# Patient Record
Sex: FEMALE | Race: WHITE | NOT HISPANIC OR LATINO | URBAN - METROPOLITAN AREA
[De-identification: names, ages, dates, MRNs, and addresses within clinical notes are randomized per-mention and may not be internally consistent; named-entity substitution may affect disease eponyms.]

---

## 2017-01-17 ENCOUNTER — APPOINTMENT (RX ONLY)
Dept: URBAN - METROPOLITAN AREA CLINIC 12 | Facility: CLINIC | Age: 82
Setting detail: DERMATOLOGY
End: 2017-01-17

## 2017-01-17 DIAGNOSIS — L82.0 INFLAMED SEBORRHEIC KERATOSIS: ICD-10-CM

## 2017-01-17 DIAGNOSIS — L82.1 OTHER SEBORRHEIC KERATOSIS: ICD-10-CM

## 2017-01-17 DIAGNOSIS — L44.8 OTHER SPECIFIED PAPULOSQUAMOUS DISORDERS: ICD-10-CM

## 2017-01-17 DIAGNOSIS — Z85.828 PERSONAL HISTORY OF OTHER MALIGNANT NEOPLASM OF SKIN: ICD-10-CM

## 2017-01-17 DIAGNOSIS — L57.0 ACTINIC KERATOSIS: ICD-10-CM

## 2017-01-17 PROBLEM — L44.9 PAPULOSQUAMOUS DISORDER, UNSPECIFIED: Status: ACTIVE | Noted: 2017-01-17

## 2017-01-17 PROBLEM — D48.5 NEOPLASM OF UNCERTAIN BEHAVIOR OF SKIN: Status: ACTIVE | Noted: 2017-01-17

## 2017-01-17 PROCEDURE — 11310 SHAVE SKIN LESION 0.5 CM/<: CPT | Mod: 59

## 2017-01-17 PROCEDURE — 99213 OFFICE O/P EST LOW 20 MIN: CPT | Mod: 25

## 2017-01-17 PROCEDURE — ? SHAVE REMOVAL

## 2017-01-17 PROCEDURE — ? COUNSELING

## 2017-01-17 PROCEDURE — 17000 DESTRUCT PREMALG LESION: CPT

## 2017-01-17 PROCEDURE — ? TREATMENT REGIMEN

## 2017-01-17 PROCEDURE — ? LIQUID NITROGEN

## 2017-01-17 ASSESSMENT — LOCATION SIMPLE DESCRIPTION DERM
LOCATION SIMPLE: RIGHT CHEEK
LOCATION SIMPLE: RIGHT PRETIBIAL REGION
LOCATION SIMPLE: LEFT FOREARM
LOCATION SIMPLE: RIGHT FOREARM
LOCATION SIMPLE: RIGHT UPPER ARM

## 2017-01-17 ASSESSMENT — LOCATION ZONE DERM
LOCATION ZONE: ARM
LOCATION ZONE: FACE
LOCATION ZONE: LEG

## 2017-01-17 ASSESSMENT — LOCATION DETAILED DESCRIPTION DERM
LOCATION DETAILED: RIGHT SUPERIOR CENTRAL BUCCAL CHEEK
LOCATION DETAILED: RIGHT PROXIMAL PRETIBIAL REGION
LOCATION DETAILED: LEFT DISTAL DORSAL FOREARM
LOCATION DETAILED: RIGHT ANTERIOR PROXIMAL UPPER ARM
LOCATION DETAILED: RIGHT DISTAL DORSAL FOREARM
LOCATION DETAILED: RIGHT INFERIOR LATERAL MALAR CHEEK

## 2017-01-17 NOTE — PROCEDURE: MIPS QUALITY
Detail Level: Generalized
Quality 130: Documentation Of Current Medications In The Medical Record: Current Medications Documented

## 2017-01-17 NOTE — HPI: SKIN LESION
Is This A New Presentation, Or A Follow-Up?: Skin Lesion
How Severe Is Your Skin Lesion?: mild
Has Your Skin Lesion Been Treated?: not been treated
Additional History: Full skin exam requested, no other concerns per patient.

## 2017-01-17 NOTE — PROCEDURE: SHAVE REMOVAL
Anesthesia Type: 1% lidocaine with epinephrine and a 1:10 solution of 8.4% sodium bicarbonate
Size Of Lesion In Cm (Required): 0.5
Hemostasis: Aluminum Chloride and Electrocautery
Billing Type: Third-Party Bill
Wound Care: Vaseline
Size Of Margin In Cm (Margins Are Not Added To Billing Dimensions): 0
Render Post-Care Instructions In Note?: no
Medical Necessity Information: It is in your best interest to select a reason for this procedure from the list below. All of these items fulfill various CMS LCD requirements except the new and changing color options.
Medical Necessity Clause: This procedure was medically necessary because the lesion that was treated was:
Path Notes (To The Dermatopathologist): Please check margins.
Post-Care Instructions: I reviewed with the patient in detail post-care instructions. Patient is to keep the biopsy site dry overnight, and then apply bacitracin twice daily until healed. Patient may apply hydrogen peroxide soaks to remove any crusting.
Biopsy Method: 15 blade
Consent was obtained from the patient. The risks and benefits to therapy were discussed in detail. Specifically, the risks of infection, scarring, bleeding, prolonged wound healing, incomplete removal, allergy to anesthesia, nerve injury and recurrence were addressed. Prior to the procedure, the treatment site was clearly identified and confirmed by the patient. All components of Universal Protocol/PAUSE Rule completed.
Lab: [id number]
Detail Level: Detailed
Notification Instructions: Patient will be notified of biopsy results. However, patient instructed to call the office if not contacted within 2 weeks.

## 2017-01-17 NOTE — PROCEDURE: LIQUID NITROGEN
Render Post-Care Instructions In Note?: no
Number Of Freeze-Thaw Cycles: 2 freeze-thaw cycles
Post-Care Instructions: I reviewed with the patient in detail post-care instructions. Patient is to wear sunprotection, and avoid picking at any of the treated lesions. Pt may apply Vaseline to crusted or scabbing areas.
Detail Level: Detailed
Duration Of Freeze Thaw-Cycle (Seconds): 10
Consent: The patient's consent was obtained including but not limited to risks of crusting, scabbing, blistering, scarring, darker or lighter pigmentary change, recurrence, incomplete removal and infection.

## 2018-03-13 ENCOUNTER — APPOINTMENT (RX ONLY)
Dept: URBAN - METROPOLITAN AREA CLINIC 12 | Facility: CLINIC | Age: 83
Setting detail: DERMATOLOGY
End: 2018-03-13

## 2018-03-13 DIAGNOSIS — L21.8 OTHER SEBORRHEIC DERMATITIS: ICD-10-CM

## 2018-03-13 DIAGNOSIS — L82.1 OTHER SEBORRHEIC KERATOSIS: ICD-10-CM

## 2018-03-13 PROCEDURE — ? PRESCRIPTION

## 2018-03-13 PROCEDURE — ? COUNSELING

## 2018-03-13 PROCEDURE — 99213 OFFICE O/P EST LOW 20 MIN: CPT

## 2018-03-13 PROCEDURE — ? IN-HOUSE DISPENSING PHARMACY

## 2018-03-13 PROCEDURE — ? TREATMENT REGIMEN

## 2018-03-13 RX ORDER — CLOBETASOL PROPIONATE 0.05 %
SHAMPOO TOPICAL
Qty: 1 | Refills: 2 | Status: ERX | COMMUNITY
Start: 2018-03-13

## 2018-03-13 RX ADMIN — Medication: at 00:00

## 2018-03-13 ASSESSMENT — LOCATION SIMPLE DESCRIPTION DERM
LOCATION SIMPLE: SCALP
LOCATION SIMPLE: RIGHT UPPER BACK

## 2018-03-13 ASSESSMENT — LOCATION ZONE DERM
LOCATION ZONE: TRUNK
LOCATION ZONE: SCALP

## 2018-03-13 ASSESSMENT — LOCATION DETAILED DESCRIPTION DERM
LOCATION DETAILED: RIGHT MEDIAL UPPER BACK
LOCATION DETAILED: LEFT SUPERIOR PARIETAL SCALP

## 2018-03-13 NOTE — HPI: BODY LOCATION - SCALP
How Severe Is Your Condition?: mild
Additional History: Diclofenac Sodium topical gel 1%, which hasn’t helped, patient hasn’t been able to sleep because of this.

## 2018-03-13 NOTE — PROCEDURE: TREATMENT REGIMEN
Detail Level: Zone
Initiate Treatment: Clobex shampoo - apply to dry scalp 20 min before showering, then add water and lather, leave in additional 5 minutes before rinsing.\\nClobetasol solution (in house) 3 refills - apply bid to scalp x2 weeks

## 2018-03-13 NOTE — PROCEDURE: IN-HOUSE DISPENSING PHARMACY
Name Of Product 2: Tret 0.05% Cream - 316787 \\n Niacinamide 4% -Tretinoin 0.05%
Product 49 Unit Type: mg
Product 60 Price/Unit (In Dollars): 0
Product 10 Price/Unit (In Dollars): 20.00
Render Refills If Set To 0: Yes
Product 6 Amount/Unit (Numbers Only): 30
Name Of Product 7: Acne Gel - [id number]\\nAdapalene 0.3% - Benzoyl Peroxide 2.5% - Niacinamide 4%
Product 15 Amount/Unit (Numbers Only): 30
Product 5 Price/Unit (In Dollars): 45.00
Product 13 Price/Unit (In Dollars): 40.00
Product 14 Amount/Unit (Numbers Only): 60
Product 11 Unit Type: ml
Product 4 Refills: 4
Product 2 Application Directions: Apply 1 pump (pea-sized amount) to entire face at bed time
Name Of Product 8: Actinic Keratosis Gel - [id number]\\nImiquimoid 5% - Levocetirizine Dihydrochloride 1% - \\nNiacinamide 2%
Product 5 Refills: 3
Product 2 Refills: 2
Name Of Product 12: Clob Solution - 757164 \\nClobetasol Propionate 0.05% - Niacinamide 4%
Product 13 Application Directions: Apply to affected areas bid for up to 2 weeks then stop using 7-10 days before reusing.\\nDo not apply to face, armpit, or groin areas.
Product 16 Application Directions: Apply topically to affected area's QD
Name Of Product 4: Jason 0.1% Acne Gel - 765714 \\n Niacinamide 4% - Tazoratene 0.1%
Product 6 Price/Unit (In Dollars): 35.00
Product 15 Application Directions: Use bid for 2 weeks then stop using for 10-14 days before reusing
Product 11 Application Directions: Apply topically to  affected area's QD-BID
Product 12 Application Directions: Apply to affected areas bid for up to 3 weeks then stop using 10-14 days before reusing.\\nDo not apply to face, armpit, or groin areas.
Product 8 Application Directions: Apply topically to affected area's as directed by physician
Product 13 Amount/Unit (Numbers Only): 60
Name Of Product 10: Antibacterial Wound Ointment -704359 \\nAloe Vera 0.2% - Lidocaine 2% - Mupirocin 2%-Tranilast 1%
Name Of Product 9: Anti-Fungal Nail Solution 295620 - \\nCiclopirox 8% - Fluconazole 1% - Terbinafine 1%
Name Of Product 16: Fluocinolone Scalp & Body Oil - 082551 \\nFluocinolone Acetonide 0.01% In Emu and Olive Oil
Product 14 Unit Type: grams
Product 7 Application Directions: Apply topically to affected area's QD - BID
Name Of Product 14: Dermatitis Cream -049505  **60GM**\\nClobetasol Propionate 0.05% - Niacinamide 4%
Name Of Product 5: Acne Gel With Dapsone -160208\\nDapsone 8.5% - Niacinamide 2% - Spironolactone 5%
Product 18 Application Directions: Apply to warts then occlusion at bedtime
Name Of Product 11: Desonide Dermatitis Cream - 659415 \\nDesonide 0.05% - Niacinamide 4%
Name Of Product 18: Wart Gel - 563270 \\nCimetidine 5% - Ibuprofen 2% - Salicylic Acid 17%
Name Of Product 6: Acne Gel Combo -127495\\nBenzoyl Peroxide 5% - Clidamycin 1% - Niacinamide 4%
Name Of Product 13: Clob Ointment - 997079 **60GM**\\nClobetasol Propionate 0.05% - Niacinamide 4%
Product 14 Application Directions: Apply bid to affected areas for up to 3 weeks then stop using 10-14 days before reusing. \\nDo not use on face, armpit, or groin areas.
Product 4 Application Directions: Apply a pea-sized amount to entire face at bed time
Name Of Product 3: Tret 0.1% Cream - 676551 \\n Niacinamide 4% -Tretinoin 0.1%
Name Of Product 1: Tret 0.025% Cream - 266858 - \\nNiacinamide 4% -Tretinoin 0.025%
Product 12 Units Dispensed: 1
Product 10 Application Directions: Apply to wound BID X 1-2 weeks
Detail Level: Zone
Name Of Product 17: Rosacea Cream -741506\\nIvermectin 1% - Metronidazole 1% - Niacinamide 4% - Potassium Azeloyl Diglycinate 5%
Product 32 Amount/Unit (Numbers Only): 15
Product 9 Application Directions: Apply to affected toenails QD
Name Of Product 15: Triam Dermatitis Cream - 080650 \\nNiacinamide 4% - Triamcinolone Acetonide 0.1%
Product 17 Application Directions: Apply topically to face Bid
Name Of Product 15: Triam Dermatitis Cream - 036100 \\nNiacinamide 4% - Triamcinolone Acetonide 0.1%
Name Of Product 1: Tret 0.025% Cream - 501314 - \\nNiacinamide 4% -Tretinoin 0.025%
Name Of Product 18: Wart Gel - 857391 \\nCimetidine 5% - Ibuprofen 2% - Salicylic Acid 17%
Name Of Product 2: Tret 0.05% Cream - 939426 \\n Niacinamide 4% -Tretinoin 0.05%
Name Of Product 3: Tret 0.1% Cream - 189872 \\n Niacinamide 4% -Tretinoin 0.1%
Name Of Product 12: Clob Solution - 389892 \\nClobetasol Propionate 0.05% - Niacinamide 4%
Name Of Product 16: Fluocinolone Scalp & Body Oil - 671590 \\nFluocinolone Acetonide 0.01% In Emu and Olive Oil
Name Of Product 11: Desonide Dermatitis Cream - 623101 \\nDesonide 0.05% - Niacinamide 4%
Name Of Product 14: Dermatitis Cream -385348  **60GM**\\nClobetasol Propionate 0.05% - Niacinamide 4%
Product 12 Application Directions: Apply to affected areas bid for up to 3 weeks then stop using 10-14 days before reusing.\\nDo not apply to face, armpit, or groin areas.
Name Of Product 13: Clob Ointment - 798705 **60GM**\\nClobetasol Propionate 0.05% - Niacinamide 4%
Name Of Product 10: Antibacterial Wound Ointment -063250 \\nAloe Vera 0.2% - Lidocaine 2% - Mupirocin 2%-Tranilast 1%
Name Of Product 4: Jason 0.1% Acne Gel - 185477 \\n Niacinamide 4% - Tazoratene 0.1%
Product 13 Application Directions: Apply to affected areas bid for up to 2 weeks then stop using 7-10 days before reusing.\\nDo not apply to face, armpit, or groin areas.

## 2018-03-13 NOTE — PROCEDURE: MIPS QUALITY
Quality 111:Pneumonia Vaccination Status For Older Adults: Pneumococcal Vaccination Previously Received
Detail Level: Detailed
Quality 130: Documentation Of Current Medications In The Medical Record: Current Medications not Documented, Patient not Eligible
Quality 110: Preventive Care And Screening: Influenza Immunization: Influenza Immunization previously received during influenza season
Additional Notes: Patient will have med list faxed over to update

## 2018-07-26 RX ORDER — CLOBETASOL PROPIONATE 0.05 %
SHAMPOO TOPICAL
Qty: 1 | Refills: 2 | Status: ERX

## 2018-09-27 ENCOUNTER — APPOINTMENT (RX ONLY)
Dept: URBAN - METROPOLITAN AREA CLINIC 12 | Facility: CLINIC | Age: 83
Setting detail: DERMATOLOGY
End: 2018-09-27

## 2018-09-27 DIAGNOSIS — L29.8 OTHER PRURITUS: ICD-10-CM

## 2018-09-27 DIAGNOSIS — L29.89 OTHER PRURITUS: ICD-10-CM

## 2018-09-27 PROCEDURE — ? PRESCRIPTION

## 2018-09-27 PROCEDURE — ? COUNSELING

## 2018-09-27 PROCEDURE — 99212 OFFICE O/P EST SF 10 MIN: CPT

## 2018-09-27 PROCEDURE — ? TREATMENT REGIMEN

## 2018-09-27 RX ORDER — DOXEPIN HYDROCHLORIDE 10 MG/ML
SOLUTION ORAL
Qty: 1 | Refills: 4 | Status: ERX | COMMUNITY
Start: 2018-09-27

## 2018-09-27 RX ADMIN — DOXEPIN HYDROCHLORIDE: 10 SOLUTION ORAL at 20:25

## 2018-09-27 ASSESSMENT — LOCATION DETAILED DESCRIPTION DERM: LOCATION DETAILED: RIGHT SUPERIOR PARIETAL SCALP

## 2018-09-27 ASSESSMENT — LOCATION SIMPLE DESCRIPTION DERM: LOCATION SIMPLE: SCALP

## 2018-09-27 ASSESSMENT — LOCATION ZONE DERM: LOCATION ZONE: SCALP

## 2018-09-27 NOTE — PROCEDURE: TREATMENT REGIMEN
Otc Regimen: Recommended Scalpacin as needed.
Detail Level: Simple
Plan: Patient will have Primary Care Physician send over her last bloodwork results.

## 2022-06-28 ENCOUNTER — APPOINTMENT (RX ONLY)
Dept: URBAN - METROPOLITAN AREA CLINIC 12 | Facility: CLINIC | Age: 87
Setting detail: DERMATOLOGY
End: 2022-06-28

## 2022-06-28 DIAGNOSIS — D69.2 OTHER NONTHROMBOCYTOPENIC PURPURA: ICD-10-CM

## 2022-06-28 DIAGNOSIS — L57.0 ACTINIC KERATOSIS: ICD-10-CM

## 2022-06-28 DIAGNOSIS — L85.3 XEROSIS CUTIS: ICD-10-CM

## 2022-06-28 PROCEDURE — 99203 OFFICE O/P NEW LOW 30 MIN: CPT | Mod: 25

## 2022-06-28 PROCEDURE — ? LIQUID NITROGEN

## 2022-06-28 PROCEDURE — 17000 DESTRUCT PREMALG LESION: CPT

## 2022-06-28 PROCEDURE — ? COUNSELING

## 2022-06-28 ASSESSMENT — LOCATION DETAILED DESCRIPTION DERM
LOCATION DETAILED: LEFT PROXIMAL DORSAL FOREARM
LOCATION DETAILED: RIGHT VENTRAL PROXIMAL FOREARM
LOCATION DETAILED: LEFT ELBOW
LOCATION DETAILED: LEFT ANTECUBITAL SKIN
LOCATION DETAILED: RIGHT PROXIMAL DORSAL FOREARM

## 2022-06-28 ASSESSMENT — LOCATION SIMPLE DESCRIPTION DERM
LOCATION SIMPLE: LEFT UPPER ARM
LOCATION SIMPLE: LEFT FOREARM
LOCATION SIMPLE: LEFT ELBOW
LOCATION SIMPLE: RIGHT FOREARM

## 2022-06-28 ASSESSMENT — LOCATION ZONE DERM: LOCATION ZONE: ARM

## 2022-06-28 NOTE — PROCEDURE: MIPS QUALITY
Quality 110: Preventive Care And Screening: Influenza Immunization: Influenza Immunization Administered during Influenza season
Additional Notes: Covid-19 vaccination status was not disclosed
Detail Level: Zone
Quality 111:Pneumonia Vaccination Status For Older Adults: Pneumococcal vaccine administered on or after patient’s 60th birthday and before the end of the measurement period

## 2022-06-28 NOTE — PROCEDURE: LIQUID NITROGEN
Show Applicator Variable?: Yes
Number Of Freeze-Thaw Cycles: 2 freeze-thaw cycles
Duration Of Freeze Thaw-Cycle (Seconds): 10
Consent: The patient's consent was obtained including but not limited to risks of crusting, scabbing, blistering, scarring, darker or lighter pigmentary change, recurrence, incomplete removal and infection.
Detail Level: Detailed
Post-Care Instructions: I reviewed with the patient in detail post-care instructions. Patient is to wear sunprotection, and avoid picking at any of the treated lesions. Pt may apply Vaseline to crusted or scabbing areas.
Render Note In Bullet Format When Appropriate: No